# Patient Record
(demographics unavailable — no encounter records)

---

## 2025-05-01 NOTE — HISTORY OF PRESENT ILLNESS
[FreeTextEntry1] : Follow up visit for well controlled convulsive seizures.  He was first diagnosed of seizure in 2001. He was using Keppra brand with excellent control of seizure. Last breakthrough seizure was when he changed to generic Keppra about 2010. He is otherwise symptom free.  Able to function at very high level. Teaches middle school.  Mainstains on Keppra XR 750mg 2 tabs BID.  No recent Keppra level checked or EEG.

## 2025-05-01 NOTE — DISCUSSION/SUMMARY
[FreeTextEntry1] : Excellent seizure control. Compliant with usage of Keppra XR. Will check level.  He declines EEG study.